# Patient Record
Sex: MALE | Race: OTHER | Employment: OTHER | URBAN - METROPOLITAN AREA
[De-identification: names, ages, dates, MRNs, and addresses within clinical notes are randomized per-mention and may not be internally consistent; named-entity substitution may affect disease eponyms.]

---

## 2019-06-10 NOTE — PATIENT DISCUSSION
POSTERIOR CAPSULAR FIBROSIS/OPACIFICATION, OS- VISUALLY SIGNIFICANT OS.  SCHEDULE YAG CAPSULOTOMY OS.

## 2022-11-08 ENCOUNTER — CONSULTATION/EVALUATION (OUTPATIENT)
Dept: URBAN - METROPOLITAN AREA CLINIC 39 | Facility: CLINIC | Age: 72
End: 2022-11-08

## 2022-11-08 DIAGNOSIS — H21.563: ICD-10-CM

## 2022-11-08 DIAGNOSIS — H04.123: ICD-10-CM

## 2022-11-08 DIAGNOSIS — Z79.899: ICD-10-CM

## 2022-11-08 DIAGNOSIS — H25.813: ICD-10-CM

## 2022-11-08 DIAGNOSIS — H35.3131: ICD-10-CM

## 2022-11-08 PROCEDURE — V2799PMN IMPRIMIS PRED-MOXI-NEPAF 5ML

## 2022-11-08 PROCEDURE — 92134 CPTRZ OPH DX IMG PST SGM RTA: CPT

## 2022-11-08 PROCEDURE — 99204 OFFICE O/P NEW MOD 45 MIN: CPT

## 2022-11-08 PROCEDURE — 92136TC INTERFEROMETRY - TECHNICAL COMPONENT

## 2022-11-08 PROCEDURE — 92025-1 CORNEAL TOPOGRAPHY, INS

## 2022-11-08 ASSESSMENT — VISUAL ACUITY
OS_BAT: 20/400
OS_SC: 20/200
OD_SC: 20/200
OD_BAT: 20/400
OD_CC: 20/40
OS_SC: <J10
OD_RAM: 20/40
OD_SC: <J10
OS_AM: 20/40
OS_CC: J5
OD_CC: J3
OS_CC: 20/40+1

## 2022-11-08 ASSESSMENT — TONOMETRY
OD_IOP_MMHG: 17
OS_IOP_MMHG: 17

## 2022-11-22 ENCOUNTER — PRE-OP/H&P (OUTPATIENT)
Dept: URBAN - METROPOLITAN AREA CLINIC 39 | Facility: CLINIC | Age: 72
End: 2022-11-22

## 2022-11-22 ENCOUNTER — SURGERY/PROCEDURE (OUTPATIENT)
Dept: URBAN - METROPOLITAN AREA CLINIC 39 | Facility: CLINIC | Age: 72
End: 2022-11-22

## 2022-11-22 ENCOUNTER — POST-OP (OUTPATIENT)
Dept: URBAN - METROPOLITAN AREA CLINIC 39 | Facility: CLINIC | Age: 72
End: 2022-11-22

## 2022-11-22 DIAGNOSIS — H21.563: ICD-10-CM

## 2022-11-22 DIAGNOSIS — Z96.1: ICD-10-CM

## 2022-11-22 DIAGNOSIS — H25.813: ICD-10-CM

## 2022-11-22 DIAGNOSIS — H35.3131: ICD-10-CM

## 2022-11-22 DIAGNOSIS — Z79.899: ICD-10-CM

## 2022-11-22 DIAGNOSIS — H04.123: ICD-10-CM

## 2022-11-22 PROCEDURE — 6698454CV REMOVE CATARACT, INSERT LENS,SX ONLY, CUSTOM VISION

## 2022-11-22 PROCEDURE — 66999LNSR LENSAR LASER FOR CAT SX

## 2022-11-22 PROCEDURE — 99211T TECH SERVICE

## 2022-11-22 PROCEDURE — 65772LRI LRI DURING CAT SX

## 2022-11-22 ASSESSMENT — VISUAL ACUITY: OS_SC: CF 4FT

## 2022-11-22 ASSESSMENT — TONOMETRY: OS_IOP_MMHG: 16

## 2022-11-30 ENCOUNTER — SURGERY/PROCEDURE (OUTPATIENT)
Dept: URBAN - METROPOLITAN AREA CLINIC 39 | Facility: CLINIC | Age: 72
End: 2022-11-30

## 2022-11-30 ENCOUNTER — POST OP/EVAL OF SECOND EYE (OUTPATIENT)
Dept: URBAN - METROPOLITAN AREA CLINIC 39 | Facility: CLINIC | Age: 72
End: 2022-11-30

## 2022-11-30 ENCOUNTER — POST-OP (OUTPATIENT)
Dept: URBAN - METROPOLITAN AREA CLINIC 39 | Facility: CLINIC | Age: 72
End: 2022-11-30

## 2022-11-30 DIAGNOSIS — H21.563: ICD-10-CM

## 2022-11-30 DIAGNOSIS — H43.391: ICD-10-CM

## 2022-11-30 DIAGNOSIS — H35.3131: ICD-10-CM

## 2022-11-30 DIAGNOSIS — Z96.1: ICD-10-CM

## 2022-11-30 DIAGNOSIS — Z79.899: ICD-10-CM

## 2022-11-30 DIAGNOSIS — H25.811: ICD-10-CM

## 2022-11-30 DIAGNOSIS — H04.123: ICD-10-CM

## 2022-11-30 PROCEDURE — 99211T TECH SERVICE

## 2022-11-30 PROCEDURE — 92012 INTRM OPH EXAM EST PATIENT: CPT

## 2022-11-30 PROCEDURE — 6698454CV REMOVE CATARACT, INSERT LENS,SX ONLY, CUSTOM VISION

## 2022-11-30 ASSESSMENT — VISUAL ACUITY
OS_SC: 20/30
OS_SC: J8
OD_BAT: 20/400
OD_RAM: 20/40
OD_SC: J12
OD_SC: 20/200
OD_SC: 20/200

## 2022-11-30 ASSESSMENT — TONOMETRY
OD_IOP_MMHG: 14
OS_IOP_MMHG: 15

## 2024-05-08 ENCOUNTER — COMPREHENSIVE EXAM (OUTPATIENT)
Dept: URBAN - METROPOLITAN AREA CLINIC 39 | Facility: CLINIC | Age: 74
End: 2024-05-08

## 2024-05-08 DIAGNOSIS — H52.203: ICD-10-CM

## 2024-05-08 DIAGNOSIS — H26.493: ICD-10-CM

## 2024-05-08 DIAGNOSIS — H43.391: ICD-10-CM

## 2024-05-08 DIAGNOSIS — H52.01: ICD-10-CM

## 2024-05-08 DIAGNOSIS — H43.823: ICD-10-CM

## 2024-05-08 DIAGNOSIS — Z79.899: ICD-10-CM

## 2024-05-08 DIAGNOSIS — H52.4: ICD-10-CM

## 2024-05-08 DIAGNOSIS — H35.3131: ICD-10-CM

## 2024-05-08 DIAGNOSIS — H04.123: ICD-10-CM

## 2024-05-08 PROCEDURE — 92014 COMPRE OPH EXAM EST PT 1/>: CPT

## 2024-05-08 PROCEDURE — 92015 DETERMINE REFRACTIVE STATE: CPT

## 2024-05-08 PROCEDURE — 92134 CPTRZ OPH DX IMG PST SGM RTA: CPT | Mod: 25

## 2024-05-08 ASSESSMENT — TONOMETRY
OS_IOP_MMHG: 16
OD_IOP_MMHG: 16

## 2024-05-08 ASSESSMENT — VISUAL ACUITY
OD_SC: J10
OD_SC: 20/40-1
OD_CC: J3
OS_SC: J10
OS_CC: J3

## 2024-08-13 ENCOUNTER — CONSULTATION/EVALUATION (OUTPATIENT)
Dept: URBAN - METROPOLITAN AREA CLINIC 39 | Facility: CLINIC | Age: 74
End: 2024-08-13

## 2024-08-13 DIAGNOSIS — H35.30: ICD-10-CM

## 2024-08-13 DIAGNOSIS — H04.123: ICD-10-CM

## 2024-08-13 DIAGNOSIS — H43.391: ICD-10-CM

## 2024-08-13 DIAGNOSIS — H35.073: ICD-10-CM

## 2024-08-13 DIAGNOSIS — H35.3131: ICD-10-CM

## 2024-08-13 DIAGNOSIS — H43.823: ICD-10-CM

## 2024-08-13 DIAGNOSIS — H26.493: ICD-10-CM

## 2024-08-13 PROCEDURE — 99214 OFFICE O/P EST MOD 30 MIN: CPT

## 2024-08-13 PROCEDURE — 92250 FUNDUS PHOTOGRAPHY W/I&R: CPT

## 2024-08-13 ASSESSMENT — TONOMETRY
OS_IOP_MMHG: 14
OD_IOP_MMHG: 12

## 2024-08-13 ASSESSMENT — VISUAL ACUITY
OS_SC: 20/30-2
OD_SC: 20/40-2